# Patient Record
Sex: MALE | Race: WHITE | NOT HISPANIC OR LATINO | Employment: FULL TIME | URBAN - METROPOLITAN AREA
[De-identification: names, ages, dates, MRNs, and addresses within clinical notes are randomized per-mention and may not be internally consistent; named-entity substitution may affect disease eponyms.]

---

## 2019-08-22 ENCOUNTER — TELEPHONE (OUTPATIENT)
Dept: OBGYN CLINIC | Facility: HOSPITAL | Age: 44
End: 2019-08-22

## 2019-08-22 NOTE — TELEPHONE ENCOUNTER
Left voicemail message in response to an online ortho appointment for his Neck  Gave our number to call us

## 2020-08-20 ENCOUNTER — TELEPHONE (OUTPATIENT)
Dept: OBGYN CLINIC | Facility: HOSPITAL | Age: 45
End: 2020-08-20

## 2020-08-20 NOTE — TELEPHONE ENCOUNTER
Tried calling pt to move his appt up with dr Imelda De Souza for 8/24n @ 430pm  The doctor will not be in at that time  I have openings in the morning and some 2pm slots he can come in at any of those times  L/M stating that  Call me when patient calls to reschedule time

## 2021-04-08 DIAGNOSIS — Z23 ENCOUNTER FOR IMMUNIZATION: ICD-10-CM

## 2021-04-20 ENCOUNTER — IMMUNIZATIONS (OUTPATIENT)
Dept: FAMILY MEDICINE CLINIC | Facility: HOSPITAL | Age: 46
End: 2021-04-20

## 2021-04-20 DIAGNOSIS — Z23 ENCOUNTER FOR IMMUNIZATION: Primary | ICD-10-CM

## 2021-04-20 PROCEDURE — 0001A SARS-COV-2 / COVID-19 MRNA VACCINE (PFIZER-BIONTECH) 30 MCG: CPT

## 2021-04-20 PROCEDURE — 91300 SARS-COV-2 / COVID-19 MRNA VACCINE (PFIZER-BIONTECH) 30 MCG: CPT

## 2021-05-14 ENCOUNTER — IMMUNIZATIONS (OUTPATIENT)
Dept: FAMILY MEDICINE CLINIC | Facility: HOSPITAL | Age: 46
End: 2021-05-14

## 2021-05-14 DIAGNOSIS — Z23 ENCOUNTER FOR IMMUNIZATION: Primary | ICD-10-CM

## 2021-05-14 PROCEDURE — 0002A SARS-COV-2 / COVID-19 MRNA VACCINE (PFIZER-BIONTECH) 30 MCG: CPT

## 2021-05-14 PROCEDURE — 91300 SARS-COV-2 / COVID-19 MRNA VACCINE (PFIZER-BIONTECH) 30 MCG: CPT

## 2024-01-29 ENCOUNTER — TELEPHONE (OUTPATIENT)
Dept: GASTROENTEROLOGY | Facility: CLINIC | Age: 49
End: 2024-01-29

## 2024-01-29 ENCOUNTER — OFFICE VISIT (OUTPATIENT)
Dept: GASTROENTEROLOGY | Facility: CLINIC | Age: 49
End: 2024-01-29
Payer: COMMERCIAL

## 2024-01-29 ENCOUNTER — LAB (OUTPATIENT)
Dept: LAB | Facility: HOSPITAL | Age: 49
End: 2024-01-29
Payer: COMMERCIAL

## 2024-01-29 VITALS
BODY MASS INDEX: 28.09 KG/M2 | SYSTOLIC BLOOD PRESSURE: 184 MMHG | WEIGHT: 207.4 LBS | HEIGHT: 72 IN | DIASTOLIC BLOOD PRESSURE: 89 MMHG

## 2024-01-29 DIAGNOSIS — F17.200 SMOKER: ICD-10-CM

## 2024-01-29 DIAGNOSIS — K21.9 GASTROESOPHAGEAL REFLUX DISEASE, UNSPECIFIED WHETHER ESOPHAGITIS PRESENT: ICD-10-CM

## 2024-01-29 DIAGNOSIS — R10.32 LEFT LOWER QUADRANT ABDOMINAL PAIN: Primary | ICD-10-CM

## 2024-01-29 DIAGNOSIS — R19.4 CHANGE IN BOWEL HABITS: ICD-10-CM

## 2024-01-29 DIAGNOSIS — Z12.11 COLON CANCER SCREENING: ICD-10-CM

## 2024-01-29 PROBLEM — K58.2 IRRITABLE BOWEL SYNDROME WITH BOTH CONSTIPATION AND DIARRHEA: Status: ACTIVE | Noted: 2024-01-29

## 2024-01-29 LAB
ALBUMIN SERPL BCP-MCNC: 5 G/DL (ref 3.5–5)
ALP SERPL-CCNC: 60 U/L (ref 34–104)
ALT SERPL W P-5'-P-CCNC: 27 U/L (ref 7–52)
ANION GAP SERPL CALCULATED.3IONS-SCNC: 8 MMOL/L
AST SERPL W P-5'-P-CCNC: 66 U/L (ref 13–39)
BILIRUB SERPL-MCNC: 0.55 MG/DL (ref 0.2–1)
BUN SERPL-MCNC: 10 MG/DL (ref 5–25)
CALCIUM SERPL-MCNC: 10.1 MG/DL (ref 8.4–10.2)
CHLORIDE SERPL-SCNC: 102 MMOL/L (ref 96–108)
CO2 SERPL-SCNC: 27 MMOL/L (ref 21–32)
CREAT SERPL-MCNC: 1.08 MG/DL (ref 0.6–1.3)
ERYTHROCYTE [DISTWIDTH] IN BLOOD BY AUTOMATED COUNT: 12.6 % (ref 11.6–15.1)
GFR SERPL CREATININE-BSD FRML MDRD: 79 ML/MIN/1.73SQ M
GLUCOSE P FAST SERPL-MCNC: 92 MG/DL (ref 65–99)
HCT VFR BLD AUTO: 52 % (ref 36.5–49.3)
HGB BLD-MCNC: 16.9 G/DL (ref 12–17)
MCH RBC QN AUTO: 29.3 PG (ref 26.8–34.3)
MCHC RBC AUTO-ENTMCNC: 32.5 G/DL (ref 31.4–37.4)
MCV RBC AUTO: 90 FL (ref 82–98)
PLATELET # BLD AUTO: 297 THOUSANDS/UL (ref 149–390)
PMV BLD AUTO: 11.6 FL (ref 8.9–12.7)
POTASSIUM SERPL-SCNC: 5.5 MMOL/L (ref 3.5–5.3)
PROT SERPL-MCNC: 7.8 G/DL (ref 6.4–8.4)
RBC # BLD AUTO: 5.77 MILLION/UL (ref 3.88–5.62)
SODIUM SERPL-SCNC: 137 MMOL/L (ref 135–147)
WBC # BLD AUTO: 10.48 THOUSAND/UL (ref 4.31–10.16)

## 2024-01-29 PROCEDURE — 86364 TISS TRNSGLTMNASE EA IG CLAS: CPT

## 2024-01-29 PROCEDURE — 86231 EMA EACH IG CLASS: CPT

## 2024-01-29 PROCEDURE — 80053 COMPREHEN METABOLIC PANEL: CPT

## 2024-01-29 PROCEDURE — 86258 DGP ANTIBODY EACH IG CLASS: CPT

## 2024-01-29 PROCEDURE — 99204 OFFICE O/P NEW MOD 45 MIN: CPT | Performed by: INTERNAL MEDICINE

## 2024-01-29 PROCEDURE — 36415 COLL VENOUS BLD VENIPUNCTURE: CPT

## 2024-01-29 PROCEDURE — 85027 COMPLETE CBC AUTOMATED: CPT

## 2024-01-29 PROCEDURE — 82784 ASSAY IGA/IGD/IGG/IGM EACH: CPT

## 2024-01-29 RX ORDER — CLONAZEPAM 0.5 MG/1
TABLET ORAL
COMMUNITY

## 2024-01-29 RX ORDER — OMEGA-3S/DHA/EPA/FISH OIL/D3 300MG-1000
400 CAPSULE ORAL DAILY
COMMUNITY

## 2024-01-29 RX ORDER — POLYETHYLENE GLYCOL 3350, SODIUM SULFATE ANHYDROUS, SODIUM BICARBONATE, SODIUM CHLORIDE, POTASSIUM CHLORIDE 236; 22.74; 6.74; 5.86; 2.97 G/4L; G/4L; G/4L; G/4L; G/4L
4000 POWDER, FOR SOLUTION ORAL ONCE
Qty: 4000 ML | Refills: 0 | Status: SHIPPED | OUTPATIENT
Start: 2024-01-29 | End: 2024-01-29

## 2024-01-29 NOTE — PROGRESS NOTES
[facility] - Outpatient Consultation  Idris Reyes 48 y.o. male MRN: [id number]  Encounter: [id number]    ASSESSMENT AND PLAN:      1. Left lower quadrant abdominal pain  This is a 48-year-old male here today at the request of Dr. Guillaume for 2 weeks of left lower quadrant abdominal pain.  Patient states that he saw his primary who told him he had prostatitis but and was given 5 days of Augmentin without real improvement of his discomfort.  Denies any fevers or chills.  At baseline does have significant anxiety with what appears to be irregular bowels.  No bleeding.    Will rule out diverticulitis given the local discomfort.  No obvious hernias palpable on exam.  If CT is negative, will proceed with colonoscopy.    - CT abdomen pelvis w contrast; Future  - Colonoscopy; Future  - polyethylene glycol (Golytely) 4000 mL solution; Take 4,000 mL by mouth once for 1 dose Take 4000 mL by mouth once for 1 dose. Use as directed  Dispense: 4000 mL; Refill: 0    2. Change in bowel habits  Long history of intermittent diarrhea and constipation.  More with softer stools especially when he is anxious.  He is a smoker.  Will rule out celiac and microscopic colitis.    - Comprehensive metabolic panel; Future  - Celiac Disease Antibody Profile; Future  - CBC; Future  - Colonoscopy; Future  - polyethylene glycol (Golytely) 4000 mL solution; Take 4,000 mL by mouth once for 1 dose Take 4000 mL by mouth once for 1 dose. Use as directed  Dispense: 4000 mL; Refill: 0    3. Gastroesophageal reflux disease, unspecified whether esophagitis present  Takes omeprazole 40 mg daily.  States he had an EGD was was negative over 10 years ago.  Can consider repeating EGD after the acute abdominal pain issues have resolved.    4. Colon cancer screening  Average risk, states he had a negative Cologuard couple years ago.  Also had a negative colonoscopy over 10 years ago.    5. Smoker  Active smoker, history of  palate carcinoma.  Recommend smoking cessation.      Followup Appointment: After above studies  ______________________________________________________________________    Chief Complaint   Patient presents with   • Lower, left quadrant pain   • Diarrhea   • Constipation       HPI:   Idris Reyes is a 48 y.o. year old male who presents today at the request of his PCP for left lower quadrant abdominal pain and irregular bowels.  Patient states that he has always had a lot of anxiety.  Takes Cymbalta and Klonopin.  Because of this, he is always had some irregular bowels usually alternating from constipation to loose bowels.  Generally more on the looser side and he did have a colonoscopy with an endoscopy over 10 years ago which was otherwise unremarkable.  Denies significant bleeding or weight changes.    2 weeks ago he started having significant left lower quadrant discomfort.  He saw his primary because he was also having some dysuria.  The pain was sharp constant in the left lower quadrant, radiating down his left groin area.  No bleeding.  He was told he has prostatitis and was given Bactrim.  Bactrim caused diarrhea so after 5 days he stopped it.  Since then, his bowels are more or less back to baseline, soft, but now has ongoing left lower quadrant discomfort.  No fevers or chills.  No nausea or vomiting.  Never had an episode of diverticulitis in the past.    Historical Information   Past Medical History:   Diagnosis Date   • Anxiety    • GERD (gastroesophageal reflux disease)    • Oral cancer (HCC)      Past Surgical History:   Procedure Laterality Date   • COLONOSCOPY  2013   • MOUTH SURGERY      oral cancer     Social History     Substance and Sexual Activity   Alcohol Use Not Currently     Social History     Substance and Sexual Activity   Drug Use No     Social History     Tobacco Use   Smoking Status Every Day   • Current packs/day: 1.00   • Types: Cigarettes   Smokeless Tobacco Former     Family  History   Problem Relation Age of Onset   • Heart disease Father    • Heart disease Brother    • Colon cancer Neg Hx    • Colon polyps Neg Hx        Meds/Allergies     Current Outpatient Medications:   •  B Complex Vitamins (VITAMIN B COMPLEX PO)  •  cholecalciferol (VITAMIN D3) 400 units tablet  •  clonazePAM (KlonoPIN) 0.5 mg tablet  •  DULoxetine (CYMBALTA) 60 mg delayed release capsule  •  Multiple Vitamins-Minerals (ZINC PO)  •  omeprazole (PriLOSEC) 40 MG capsule  •  polyethylene glycol (Golytely) 4000 mL solution    Allergies   Allergen Reactions   • Ciprofloxacin Hives   • Levaquin [Levofloxacin] Anxiety   • Sulfa Antibiotics Anxiety       PHYSICAL EXAM:    Blood pressure (!) 184/89, height 6' (1.829 m), weight 94.1 kg (207 lb 6.4 oz). Body mass index is 28.13 kg/m².  General Appearance: NAD, cooperative, alert  Eyes: Anicteric, conjunctiva pink   ENT:  Normocephalic, atraumatic, normal mucosa.    Neck:  Supple, symmetrical, trachea midline,   Resp:  Clear to auscultation bilaterally; no rales, rhonchi or wheezing; respirations unlabored   CV:  S1 S2, Regular rate and rhythm; no murmur, rub, or gallop.  GI:  Soft, left lower quadrant tender to palpation, no obvious hernias, non-distended; normal bowel sounds; no masses, no organomegaly   Rectal: Deferred  Musculoskeletal: No cyanosis, clubbing or edema. Normal ROM.  Skin:  No jaundice, rashes, or lesions   Heme/Lymph: No palpable cervical lymphadenopathy  Psych: Normal affect, good eye contact  Neuro: No gross deficits, AAOx3      REVIEW OF SYSTEMS:    CONSTITUTIONAL: Denies any fever, chills, rigors, and weight loss.  HEENT: No earache or tinnitus. Denies hearing loss or visual disturbances.  CARDIOVASCULAR: No chest pain or palpitations.   RESPIRATORY: Denies any cough, hemoptysis, shortness of breath or dyspnea on exertion.  GASTROINTESTINAL: As noted in the History of Present Illness.   GENITOURINARY: Per HPI  NEUROLOGIC: No dizziness or vertigo, denies  headaches.   MUSCULOSKELETAL: Denies any muscle or joint pain.   SKIN: Denies skin rashes or itching.   ENDOCRINE: Denies excessive thirst. Denies intolerance to heat or cold.  PSYCHOSOCIAL: Positive history of anxiety and depression

## 2024-01-29 NOTE — TELEPHONE ENCOUNTER
Scheduled date of colonoscopy (as of today):3-14-24  Physician performing colonoscopy:Shin  Location of colonoscopy:[facility]  Bowel prep reviewed with patient:Valerie Gates reviewclari  wife     Ermane

## 2024-01-30 LAB
ENDOMYSIUM IGA SER QL: NEGATIVE
GLIADIN PEPTIDE IGA SER-ACNC: 5 UNITS (ref 0–19)
GLIADIN PEPTIDE IGG SER-ACNC: 3 UNITS (ref 0–19)
IGA SERPL-MCNC: 212 MG/DL (ref 90–386)
TTG IGA SER-ACNC: <2 U/ML (ref 0–3)
TTG IGG SER-ACNC: <2 U/ML (ref 0–5)

## 2024-02-06 ENCOUNTER — TELEPHONE (OUTPATIENT)
Dept: GASTROENTEROLOGY | Facility: CLINIC | Age: 49
End: 2024-02-06

## 2024-02-06 NOTE — TELEPHONE ENCOUNTER
Patients GI provider:  Dr. Elliott    Number to return call: [phone]    Reason for call: Pt calling stating CT scan needs PA. Pt would like auth done asap as he is having anxiety about the CT scan and wants it done sooner but can't move it up until he has the auth.     Scheduled procedure/appointment date if applicable:

## 2024-02-06 NOTE — TELEPHONE ENCOUNTER
Pt said that the ct department has not received the prior auth and the pt would like the prior auth number to provide the ct department. I gave pt the Auth number     [id number]

## 2024-02-12 ENCOUNTER — HOSPITAL ENCOUNTER (OUTPATIENT)
Dept: CT IMAGING | Facility: HOSPITAL | Age: 49
Discharge: HOME | End: 2024-02-12
Attending: INTERNAL MEDICINE
Payer: COMMERCIAL

## 2024-02-12 DIAGNOSIS — R10.32 LEFT LOWER QUADRANT ABDOMINAL PAIN: ICD-10-CM

## 2024-02-12 PROCEDURE — G1004 CDSM NDSC: HCPCS

## 2024-02-12 PROCEDURE — 74177 CT ABD & PELVIS W/CONTRAST: CPT

## 2024-02-12 RX ADMIN — IOHEXOL 100 ML: 350 INJECTION, SOLUTION INTRAVENOUS at 19:38

## 2024-02-15 ENCOUNTER — NURSE TRIAGE (OUTPATIENT)
Age: 49
End: 2024-02-15

## 2024-02-15 NOTE — TELEPHONE ENCOUNTER
Pt looking for results of CT done on 2/12. He is very anxious, I did inform him the results have not been read by radiologist yet.

## 2024-02-21 PROBLEM — Z12.11 COLON CANCER SCREENING: Status: RESOLVED | Noted: 2024-01-29 | Resolved: 2024-02-21

## 2024-02-29 ENCOUNTER — ANESTHESIA EVENT (OUTPATIENT)
Dept: ANESTHESIOLOGY | Facility: AMBULATORY SURGERY CENTER | Age: 49
End: 2024-02-29

## 2024-02-29 ENCOUNTER — ANESTHESIA (OUTPATIENT)
Dept: ANESTHESIOLOGY | Facility: AMBULATORY SURGERY CENTER | Age: 49
End: 2024-02-29

## 2024-03-05 ENCOUNTER — TELEPHONE (OUTPATIENT)
Dept: GASTROENTEROLOGY | Facility: CLINIC | Age: 49
End: 2024-03-05

## 2024-03-05 NOTE — TELEPHONE ENCOUNTER
Procedure confirmed  Colonoscopy     Via: [email or web address]    Instructions given: Given to Patient at Visit     Prep Given: Golytely    Call the office if there are any questions.

## 2024-03-13 NOTE — TELEPHONE ENCOUNTER
Pt rescheduled his colonoscopy to 5/9/24 due to possible covid. I called [facility] and spoke to Liv to let them know

## 2024-04-25 ENCOUNTER — ANESTHESIA (OUTPATIENT)
Dept: ANESTHESIOLOGY | Facility: AMBULATORY SURGERY CENTER | Age: 49
End: 2024-04-25

## 2024-04-25 ENCOUNTER — ANESTHESIA EVENT (OUTPATIENT)
Dept: ANESTHESIOLOGY | Facility: AMBULATORY SURGERY CENTER | Age: 49
End: 2024-04-25

## 2024-05-01 ENCOUNTER — TELEPHONE (OUTPATIENT)
Dept: GASTROENTEROLOGY | Facility: CLINIC | Age: 49
End: 2024-05-01

## 2024-05-01 NOTE — TELEPHONE ENCOUNTER
Procedure confirmed  Colonoscopy     Via: [email or web address]    Instructions given: Given to Patient at Visit     Prep Given: Golytely    Call the office if there are any questions.

## 2024-05-08 ENCOUNTER — TELEPHONE (OUTPATIENT)
Age: 49
End: 2024-05-08

## 2024-05-08 NOTE — TELEPHONE ENCOUNTER
Rescheduled date of colonoscopy (as of today): 06/12/2024  Physician performing colonoscopy: DR FORMAN  Location of colonoscopy: [facility]  Bowel prep reviewed with patient: GOLYTELY  Instructions reviewed with patient by: Previously reviewed with pt. Verified pt has all procedure directions.  Clearances: n/a      Patient rescheduled due to work.

## 2024-05-28 ENCOUNTER — TELEPHONE (OUTPATIENT)
Dept: GASTROENTEROLOGY | Facility: CLINIC | Age: 49
End: 2024-05-28

## 2024-05-28 NOTE — TELEPHONE ENCOUNTER
Procedure confirmed  Colonoscopy     Via: [employer]    Instructions given: Given to Patient at Visit     Prep Given: Golytely    Call the office if there are any questions.

## 2024-05-29 ENCOUNTER — ANESTHESIA (OUTPATIENT)
Dept: ANESTHESIOLOGY | Facility: AMBULATORY SURGERY CENTER | Age: 49
End: 2024-05-29

## 2024-05-29 ENCOUNTER — ANESTHESIA EVENT (OUTPATIENT)
Dept: ANESTHESIOLOGY | Facility: AMBULATORY SURGERY CENTER | Age: 49
End: 2024-05-29

## 2024-06-12 ENCOUNTER — ANESTHESIA EVENT (OUTPATIENT)
Dept: GASTROENTEROLOGY | Facility: AMBULATORY SURGERY CENTER | Age: 49
End: 2024-06-12

## 2024-06-12 ENCOUNTER — HOSPITAL ENCOUNTER (OUTPATIENT)
Dept: GASTROENTEROLOGY | Facility: AMBULATORY SURGERY CENTER | Age: 49
Discharge: HOME | End: 2024-06-12
Attending: INTERNAL MEDICINE
Payer: COMMERCIAL

## 2024-06-12 ENCOUNTER — ANESTHESIA (OUTPATIENT)
Dept: GASTROENTEROLOGY | Facility: AMBULATORY SURGERY CENTER | Age: 49
End: 2024-06-12

## 2024-06-12 VITALS
WEIGHT: 205 LBS | HEART RATE: 83 BPM | BODY MASS INDEX: 27.77 KG/M2 | TEMPERATURE: 97.8 F | DIASTOLIC BLOOD PRESSURE: 90 MMHG | OXYGEN SATURATION: 99 % | RESPIRATION RATE: 12 BRPM | SYSTOLIC BLOOD PRESSURE: 148 MMHG | HEIGHT: 72 IN

## 2024-06-12 DIAGNOSIS — R19.4 CHANGE IN BOWEL HABITS: ICD-10-CM

## 2024-06-12 DIAGNOSIS — R10.32 LEFT LOWER QUADRANT ABDOMINAL PAIN: ICD-10-CM

## 2024-06-12 PROCEDURE — 45385 COLONOSCOPY W/LESION REMOVAL: CPT | Performed by: INTERNAL MEDICINE

## 2024-06-12 PROCEDURE — 45380 COLONOSCOPY AND BIOPSY: CPT | Performed by: INTERNAL MEDICINE

## 2024-06-12 PROCEDURE — 88305 TISSUE EXAM BY PATHOLOGIST: CPT | Performed by: STUDENT IN AN ORGANIZED HEALTH CARE EDUCATION/TRAINING PROGRAM

## 2024-06-12 RX ORDER — SODIUM CHLORIDE, SODIUM LACTATE, POTASSIUM CHLORIDE, CALCIUM CHLORIDE 600; 310; 30; 20 MG/100ML; MG/100ML; MG/100ML; MG/100ML
50 INJECTION, SOLUTION INTRAVENOUS CONTINUOUS
Status: DISCONTINUED | OUTPATIENT
Start: 2024-06-12 | End: 2024-06-16 | Stop reason: HOSPADM

## 2024-06-12 RX ORDER — PROPOFOL 10 MG/ML
INJECTION, EMULSION INTRAVENOUS AS NEEDED
Status: DISCONTINUED | OUTPATIENT
Start: 2024-06-12 | End: 2024-06-12

## 2024-06-12 RX ORDER — LIDOCAINE HYDROCHLORIDE 10 MG/ML
INJECTION, SOLUTION EPIDURAL; INFILTRATION; INTRACAUDAL; PERINEURAL AS NEEDED
Status: DISCONTINUED | OUTPATIENT
Start: 2024-06-12 | End: 2024-06-12

## 2024-06-12 RX ADMIN — PROPOFOL 50 MG: 10 INJECTION, EMULSION INTRAVENOUS at 09:42

## 2024-06-12 RX ADMIN — SODIUM CHLORIDE, SODIUM LACTATE, POTASSIUM CHLORIDE, CALCIUM CHLORIDE 50 ML/HR: 600; 310; 30; 20 INJECTION, SOLUTION INTRAVENOUS at 09:11

## 2024-06-12 RX ADMIN — PROPOFOL 200 MG: 10 INJECTION, EMULSION INTRAVENOUS at 09:38

## 2024-06-12 RX ADMIN — PROPOFOL 50 MG: 10 INJECTION, EMULSION INTRAVENOUS at 09:49

## 2024-06-12 RX ADMIN — PROPOFOL 50 MG: 10 INJECTION, EMULSION INTRAVENOUS at 09:54

## 2024-06-12 RX ADMIN — LIDOCAINE HYDROCHLORIDE 50 MG: 10 INJECTION, SOLUTION EPIDURAL; INFILTRATION; INTRACAUDAL; PERINEURAL at 09:38

## 2024-06-12 RX ADMIN — PROPOFOL 50 MG: 10 INJECTION, EMULSION INTRAVENOUS at 09:46

## 2024-06-12 NOTE — ANESTHESIA POSTPROCEDURE EVALUATION
Post-Op Assessment Note    CV Status:  Stable  Pain Score: 0    Pain management: adequate       Mental Status:  Alert and awake   Hydration Status:  Euvolemic   PONV Controlled:  Controlled   Airway Patency:  Patent     Post Op Vitals Reviewed: Yes    No anethesia notable event occurred.    Staff: Anesthesiologist, CRNA               BP   113/73   Temp   N/a   Pulse  81   Resp   16   SpO2   98

## 2024-06-12 NOTE — H&P
History and Physical - [facility]    Idris Reyes 48 y.o. male MRN: [id number]      HPI: Idris Reyes is a 48 y.o. male who presents for changes in bowel habits, abd pain LLQ. Normal CT A/P.     Allergies   Allergen Reactions    Ciprofloxacin Hives    Levaquin [Levofloxacin] Anxiety    Sulfa Antibiotics Anxiety         REVIEW OF SYSTEMS: Per the HPI, and otherwise unremarkable.    Historical Information     Past Medical History:   Diagnosis Date    Anxiety     GERD (gastroesophageal reflux disease)     Oral cancer (HCC)      Past Surgical History:   Procedure Laterality Date    COLONOSCOPY  2013    MOUTH SURGERY      oral cancer    WRIST SURGERY Left      Social History   Social History     Substance and Sexual Activity   Alcohol Use Not Currently     Social History     Substance and Sexual Activity   Drug Use No     Social History     Tobacco Use   Smoking Status Every Day    Current packs/day: 1.00    Types: Cigarettes   Smokeless Tobacco Former    Types: Chew     Family History   Problem Relation Age of Onset    Heart disease Father     Heart disease Brother     Colon cancer Neg Hx     Colon polyps Neg Hx        Meds/Allergies       Current Outpatient Medications:     clonazePAM (KlonoPIN) 0.5 mg tablet    DULoxetine (CYMBALTA) 60 mg delayed release capsule    omeprazole (PriLOSEC) 40 MG capsule    B Complex Vitamins (VITAMIN B COMPLEX PO)    cholecalciferol (VITAMIN D3) 400 units tablet    Multiple Vitamins-Minerals (ZINC PO)    polyethylene glycol (Golytely) 4000 mL solution    Current Facility-Administered Medications:     lactated ringers infusion, 50 mL/hr, Intravenous, Continuous, 50 mL/hr at 06/12/24 0911        Objective     /99   Pulse 103   Temp 97.8 °F (36.6 °C) (Temporal)   Resp 16   Ht 6' (1.829 m)   Wt 93 kg (205 lb)   SpO2 99%   BMI 27.80 kg/m²       PHYSICAL EXAM    Gen: NAD AAOx3  Head: Normocephalic, Atraumatic  CV: S1S2 RRR no  m/r/g  CHEST: Clear b/l no c/r/w  ABD: soft, +BS NT/ND no masses  EXT: no edema      ASSESSMENT/PLAN:  This is a 48 y.o. year old male here for colonoscopy, and he is stable and optimized for his procedure.

## 2024-06-12 NOTE — ANESTHESIA PREPROCEDURE EVALUATION
Procedure:  COLONOSCOPY    Relevant Problems   GI/HEPATIC   (+) Gastroesophageal reflux disease      NEURO/PSYCH   (+) Generalized anxiety disorder with panic attacks      PULMONARY   (+) Smoker      Oncology   (+) Carcinoma of palate (HCC)    Rmoved, no further treatment, denies hoarseness or difficulty swallowing    Physical Exam    Airway    Mallampati score: II  TM Distance: >3 FB  Neck ROM: full     Dental   No notable dental hx     Cardiovascular      Pulmonary      Other Findings        Anesthesia Plan  ASA Score- 2     Anesthesia Type- IV sedation with anesthesia with ASA Monitors.         Additional Monitors:     Airway Plan:            Plan Factors-Exercise tolerance (METS): >4 METS.    Chart reviewed.    Patient summary reviewed.    Patient is not a current smoker.              Induction- intravenous.    Postoperative Plan-         Informed Consent- Anesthetic plan and risks discussed with patient.  I personally reviewed this patient with the CRNA. Discussed and agreed on the Anesthesia Plan with the CRNA..

## 2024-06-17 PROCEDURE — 88305 TISSUE EXAM BY PATHOLOGIST: CPT | Performed by: STUDENT IN AN ORGANIZED HEALTH CARE EDUCATION/TRAINING PROGRAM

## 2024-11-01 ENCOUNTER — APPOINTMENT (OUTPATIENT)
Dept: RADIOLOGY | Facility: CLINIC | Age: 49
End: 2024-11-01
Payer: COMMERCIAL

## 2024-11-01 ENCOUNTER — OFFICE VISIT (OUTPATIENT)
Dept: PAIN MEDICINE | Facility: CLINIC | Age: 49
End: 2024-11-01
Payer: COMMERCIAL

## 2024-11-01 VITALS
BODY MASS INDEX: 27.09 KG/M2 | HEIGHT: 72 IN | SYSTOLIC BLOOD PRESSURE: 148 MMHG | TEMPERATURE: 99.5 F | WEIGHT: 200 LBS | DIASTOLIC BLOOD PRESSURE: 78 MMHG | HEART RATE: 123 BPM

## 2024-11-01 DIAGNOSIS — M54.2 NECK PAIN: ICD-10-CM

## 2024-11-01 DIAGNOSIS — F41.0 GENERALIZED ANXIETY DISORDER WITH PANIC ATTACKS: ICD-10-CM

## 2024-11-01 DIAGNOSIS — F41.1 GENERALIZED ANXIETY DISORDER WITH PANIC ATTACKS: ICD-10-CM

## 2024-11-01 DIAGNOSIS — M79.18 CERVICAL MYOFASCIAL PAIN SYNDROME: Primary | ICD-10-CM

## 2024-11-01 PROCEDURE — 99204 OFFICE O/P NEW MOD 45 MIN: CPT | Performed by: ANESTHESIOLOGY

## 2024-11-01 PROCEDURE — 72050 X-RAY EXAM NECK SPINE 4/5VWS: CPT

## 2024-11-01 NOTE — PROGRESS NOTES
Assessment  1. Cervical myofascial pain syndrome    2. Neck pain    3. Generalized anxiety disorder with panic attacks        Plan      Pleasant 49-year-old gentleman with a longstanding history of neck and muscle pain.  He also reports a great deal of tension and stress with anxiety disorder.    On physical exam he has a great deal of overlying myofascial pain.  He certainly may have underlying cervical spondylosis.  My recommendations are as follows:    I will obtain cervical radiographs to rule out any acute pathology that may be contributing to his symptoms.    I will start him on a course of physical therapy for myofascial release and nerve glides.  He is in agreement with plan and referral was placed.    We will follow-up in approximately 3 to 4 weeks time.  Will review x-ray results and results from physical therapy.  If his neck pain persists then I would consider trigger point injections with Dr. Gonzalez will reevaluate if needed.    My impressions and treatment recommendations were discussed in detail with the patient who verbalized understanding and had no further questions.  Discharge instructions were provided. I personally saw and examined the patient and I agree with the above discussed plan of care.  This note is created using dictation transcription.  It may contain typographical errors, grammatical errors, improperly dictated words, background noise and other errors.    Orders Placed This Encounter   Procedures    XR spine cervical complete 4 or 5 vw non injury     Standing Status:   Future     Standing Expiration Date:   11/1/2028     Scheduling Instructions:      Bring along any outside films relating to this procedure.           Order Specific Question:   Reason for Exam:     Answer:   neck pain    Ambulatory referral to Physical Therapy     Standing Status:   Future     Standing Expiration Date:   11/1/2025     Referral Priority:   Routine     Referral Type:   Physical Therapy     Referral  Reason:   Specialty Services Required     Requested Specialty:   Physical Therapy     Number of Visits Requested:   1     Expiration Date:   11/1/2025       Referring physician: Nilam Joseph MD  History of Present Illness    Idris Reyes is a 49 y.o. male who reports [id number] history of neck pain and upper scapular pain.  He is unaware of any clear precipitant event denies any trauma or injury.  He does admit to a great deal of stress and anxiety and diagnosed with anxiety disorder and may carry a lot of his stress in his muscles.    He did undergo chiropractic treatment but with heavy manipulation it deterred him from further care.    His pain is moderate to severe he rates it as 5 out of 10 the visual analog scale interfering with his daily living activities.  It is constant but worse in the afternoon and evening described as shooting sharp and achy.  He denies any weakness.  He reports that standing sitting and walking aggravate his symptoms.  Chiropractic treatment as reported reported no relief.    I have personally reviewed and/or updated the patient's past medical history, past surgical history, family history, social history, current medications, allergies, and vital signs today.     Review of Systems   Constitutional:  Negative for fever and unexpected weight change.   HENT:  Negative for trouble swallowing.    Eyes:  Negative for visual disturbance.   Respiratory:  Negative for shortness of breath and wheezing.    Cardiovascular:  Negative for chest pain and palpitations.   Gastrointestinal:  Negative for constipation, diarrhea, nausea and vomiting.   Endocrine: Negative for cold intolerance, heat intolerance and polydipsia.   Genitourinary:  Negative for difficulty urinating and frequency.   Musculoskeletal:  Negative for arthralgias, gait problem, joint swelling and myalgias.   Skin:  Negative for rash.   Neurological:  Positive for dizziness, numbness and headaches. Negative for  seizures, syncope and weakness.   Hematological:  Does not bruise/bleed easily.   Psychiatric/Behavioral:  Positive for dysphoric mood.    All other systems reviewed and are negative.      Patient Active Problem List   Diagnosis    Carcinoma of palate (HCC)    Generalized anxiety disorder with panic attacks    Smoking addiction    Gastroesophageal reflux disease    Left lower quadrant abdominal pain    Irritable bowel syndrome with both constipation and diarrhea    Smoker       Past Medical History:   Diagnosis Date    Anxiety     Cancer (HCC)     GERD (gastroesophageal reflux disease)     Oral cancer (HCC)        Past Surgical History:   Procedure Laterality Date    COLONOSCOPY  2013    MOUTH SURGERY      oral cancer    WRIST SURGERY Left        Family History   Problem Relation Age of Onset    Heart disease Father     Heart disease Brother     Colon cancer Neg Hx     Colon polyps Neg Hx        Social History     Occupational History    Not on file   Tobacco Use    Smoking status: Every Day     Current packs/day: 1.00     Types: Cigarettes    Smokeless tobacco: Former     Types: Chew   Vaping Use    Vaping status: Never Used   Substance and Sexual Activity    Alcohol use: Not Currently    Drug use: No    Sexual activity: Not on file       Current Outpatient Medications on File Prior to Visit   Medication Sig    B Complex Vitamins (VITAMIN B COMPLEX PO) Take by mouth    clonazePAM (KlonoPIN) 0.5 mg tablet TAKE 1 TABLET BY MOUTH TWICE A DAY AS NEEDED FOR 30 DAYS    DULoxetine (CYMBALTA) 60 mg delayed release capsule Take 60 mg by mouth daily.    Multiple Vitamins-Minerals (ZINC PO) Take by mouth    omeprazole (PriLOSEC) 40 MG capsule Take 40 mg by mouth daily.    [DISCONTINUED] cholecalciferol (VITAMIN D3) 400 units tablet Take 400 Units by mouth daily     No current facility-administered medications on file prior to visit.       Allergies   Allergen Reactions    Ciprofloxacin Hives    Levaquin [Levofloxacin] Anxiety     Sulfa Antibiotics Anxiety       Physical Exam    /78 (BP Location: Left arm, Patient Position: Sitting, Cuff Size: Standard)   Pulse (!) 123   Temp 99.5 °F (37.5 °C)   Ht 6' (1.829 m)   Wt 90.7 kg (200 lb)   BMI 27.12 kg/m²     Constitutional: normal, well developed, well nourished, alert, in no distress and non-toxic and no overt pain behavior.  Eyes: anicteric  HEENT: grossly intact  Neck: supple, symmetric, trachea midline and no masses   Pulmonary:even and unlabored  Cardiovascular:No edema or pitting edema present  Skin:Normal without rashes or lesions and well hydrated  Psychiatric:Mood and affect appropriate  Neurologic:Cranial Nerves II-XII grossly intact  Musculoskeletal:normal,  Inspection: Normal station and gait. Normal cervical curves and head posture. Skin intact without erythema. No sensory loss. There is no atrophy.   Palpation: There is mild tenderness in the cervical paraspinals mild superior trapezius tenderness right greater than left no shoulder tenderness  Motor/Strength: Equal strength in the bilateral upper extremities  Reflexes: equal and symmetric in the upper limbs. Sensation: Sensation intact in the upper limbs  Maneuvers: Negative cervical Spurling maneuver. Negative Lhermitte's sign.

## 2024-11-04 ENCOUNTER — TELEPHONE (OUTPATIENT)
Dept: PAIN MEDICINE | Facility: CLINIC | Age: 49
End: 2024-11-04

## 2024-11-04 NOTE — TELEPHONE ENCOUNTER
----- Message from Hima Tse DO sent at 11/2/2024  8:37 PM EDT -----  Mild disc degeneration on cervical x-ray, continue with current plan and follow up.

## 2025-06-10 ENCOUNTER — TELEPHONE (OUTPATIENT)
Age: 50
End: 2025-06-10

## 2025-06-10 ENCOUNTER — OFFICE VISIT (OUTPATIENT)
Dept: PAIN MEDICINE | Facility: CLINIC | Age: 50
End: 2025-06-10
Payer: COMMERCIAL

## 2025-06-10 VITALS — BODY MASS INDEX: 26.82 KG/M2 | WEIGHT: 198 LBS | HEART RATE: 130 BPM | HEIGHT: 72 IN | TEMPERATURE: 97.8 F

## 2025-06-10 DIAGNOSIS — M54.16 RIGHT LUMBAR RADICULOPATHY: Primary | ICD-10-CM

## 2025-06-10 PROCEDURE — 99214 OFFICE O/P EST MOD 30 MIN: CPT | Performed by: ANESTHESIOLOGY

## 2025-06-10 RX ORDER — PREDNISONE 10 MG/1
TABLET ORAL
Qty: 21 TABLET | Refills: 0 | Status: SHIPPED | OUTPATIENT
Start: 2025-06-10

## 2025-06-10 RX ORDER — VILAZODONE HYDROCHLORIDE 20 MG/1
20 TABLET ORAL DAILY
COMMUNITY
Start: 2025-03-27

## 2025-06-10 NOTE — PROGRESS NOTES
Name: Idris Reyes      : 1975      MRN: [id number]  Encounter Provider: Hiam Tse DO  Encounter Date: 6/10/2025   Encounter department: [facility] [place]  :  Assessment & Plan  Right lumbar radiculopathy    Orders:    X-ray lumbar spine complete 4+ views; Future    MRI lumbar spine without contrast; Future    predniSONE 10 mg tablet; Take according to titration schedule        The patient's pain persists despite time, relative rest, activity modification, and nonsteroidal anti-inflammatories. His pain is significantly interfering with his daily living activities.  He underwent a course of physical therapy.  It is appropriate to order an MRI of the lumbar spine to help evaluate any significant etiology of his symptoms.    I will start him on a titrating dose of oral prednisone to address any inflammatory component of the patient's pain. He understands he should not take nonsteroidal anti-inflammatories until he is finished with this steroid. He understands there is a chance of decreased immunity secondary to steroids. If he has any problems or questions he will give us a call.    Patient is to contact our office or present to hospital Emergency Room if experience worsening or new low back/lower extremity pain, sensory or motor change, bladder or bowel dysfunction, or other neurological change.    Once we obtain MRI results, I will follow-up with the patient, review the results and current symptoms, and discuss the next steps of the treatment plan.    My impressions and treatment recommendations were discussed in detail with the patient who verbalized understanding and had no further questions.  Discharge instructions were provided. I personally saw and examined the patient and I agree with the above discussed plan of care.    History of Present Illness     Idris Reyes is a 49 y.o. male with 6-week history of right-sided low back and lower extremity pain with subjective  weakness of the right lower limb.  He denies any loss of bowel or bladder function.  He is unaware of any clear precipitant denies any trauma or injury.  Underwent chiropractic treatment as well as physical therapy through his insurance company.  His pain is significant or fear with his daily living activities.  He describes a sharp stabbing with a numbing sensation.  Standing and walking aggravate his symptoms while rest takes the edge off.  He has tried nonsteroidal anti-inflammatories without relief.    Review of Systems   Constitutional:  Negative for fever and unexpected weight change.   HENT:  Negative for trouble swallowing.    Eyes:  Negative for visual disturbance.   Respiratory:  Negative for shortness of breath and wheezing.    Cardiovascular:  Negative for chest pain and palpitations.   Gastrointestinal:  Negative for constipation, diarrhea, nausea and vomiting.   Endocrine: Negative for cold intolerance, heat intolerance and polydipsia.   Genitourinary:  Negative for difficulty urinating and frequency.   Musculoskeletal:  Negative for arthralgias, gait problem, joint swelling and myalgias.   Skin:  Negative for rash.   Neurological:  Negative for dizziness, seizures, syncope, weakness and headaches.   Hematological:  Does not bruise/bleed easily.   Psychiatric/Behavioral:  Negative for dysphoric mood.    All other systems reviewed and are negative.    Medical History Reviewed by provider this encounter:  Tobacco  Meds  Problems  Med Hx  Surg Hx  Fam Hx     .       Objective   Pulse (!) 130   Temp 97.8 °F (36.6 °C)   Ht 6' (1.829 m)   Wt 89.8 kg (198 lb)   BMI 26.85 kg/m²      Pain Score:   6  Physical Exam  Constitutional: normal, well developed, well nourished, alert, in no distress and non-toxic and no overt pain behavior.  Eyes: anicteric  HEENT: grossly intact  Neck: supple, symmetric, trachea midline and no masses   Pulmonary: even and unlabored  Cardiovascular: No edema or pitting edema  present  Skin: Normal without rashes or lesions and well hydrated  Psychiatric: Mood and affect appropriate  Neurologic: Cranial Nerves II-XII grossly intact  Musculoskeletal: normal and antalgic, difficulty going from sitting to standing sitting position; no obvious skin lesions or erythema lumbar sacral spine; mild tenderness in the right PSIS; deep tendon reflexes are diminished but symmetrical bilateral patellar absent right Achilles 2+ left Achilles; 3 out of 5 strength right extensor hallux longus; decreased sensation in right L5 distribution to pinwheel; positive straight leg raising on the right.

## 2025-06-10 NOTE — TELEPHONE ENCOUNTER
Caller: pt    Doctor: Dr. saenz    Reason for call: pt wants to know if he's supposed to take 5 or 10 mg of prednisone and can he still take his walks?    Call back#: [phone]

## 2025-06-13 ENCOUNTER — HOSPITAL ENCOUNTER (OUTPATIENT)
Dept: RADIOLOGY | Facility: HOSPITAL | Age: 50
End: 2025-06-13
Payer: COMMERCIAL

## 2025-06-13 DIAGNOSIS — M54.16 RIGHT LUMBAR RADICULOPATHY: ICD-10-CM

## 2025-06-13 PROCEDURE — 72110 X-RAY EXAM L-2 SPINE 4/>VWS: CPT

## 2025-06-16 ENCOUNTER — APPOINTMENT (EMERGENCY)
Dept: CT IMAGING | Facility: HOSPITAL | Age: 50
End: 2025-06-16
Attending: EMERGENCY MEDICINE
Payer: COMMERCIAL

## 2025-06-16 ENCOUNTER — HOSPITAL ENCOUNTER (EMERGENCY)
Facility: HOSPITAL | Age: 50
Discharge: HOME | End: 2025-06-16
Attending: EMERGENCY MEDICINE | Admitting: EMERGENCY MEDICINE
Payer: COMMERCIAL

## 2025-06-16 VITALS
DIASTOLIC BLOOD PRESSURE: 92 MMHG | RESPIRATION RATE: 18 BRPM | OXYGEN SATURATION: 100 % | HEART RATE: 101 BPM | TEMPERATURE: 98.5 F | SYSTOLIC BLOOD PRESSURE: 169 MMHG

## 2025-06-16 DIAGNOSIS — M54.9 BACK PAIN: Primary | ICD-10-CM

## 2025-06-16 PROCEDURE — 74176 CT ABD & PELVIS W/O CONTRAST: CPT

## 2025-06-16 PROCEDURE — 99283 EMERGENCY DEPT VISIT LOW MDM: CPT

## 2025-06-16 PROCEDURE — 99284 EMERGENCY DEPT VISIT MOD MDM: CPT | Performed by: EMERGENCY MEDICINE

## 2025-06-16 RX ORDER — ACETAMINOPHEN 325 MG/1
975 TABLET ORAL ONCE
Status: COMPLETED | OUTPATIENT
Start: 2025-06-16 | End: 2025-06-16

## 2025-06-16 RX ORDER — IBUPROFEN 600 MG/1
600 TABLET, FILM COATED ORAL ONCE
Status: COMPLETED | OUTPATIENT
Start: 2025-06-16 | End: 2025-06-16

## 2025-06-16 RX ADMIN — ACETAMINOPHEN 975 MG: 325 TABLET ORAL at 08:37

## 2025-06-16 RX ADMIN — IBUPROFEN 600 MG: 600 TABLET ORAL at 08:37

## 2025-06-16 NOTE — TELEPHONE ENCOUNTER
S/w pt and advised of NM notation, pt is apprehensive to add medication due to his medications he is currently on.  Nurse then advised to take the prescribed medication ED sent in and to try OTC pain creams/lidocaine patches, rest, ice/heat.  Pt will try above and see how he does. Nurse advised once MRI results are in our office will be able to see if interventional tx options will benefit pt. Pt verbalized understanding and appreciative of call.

## 2025-06-16 NOTE — Clinical Note
Idris Reyes was seen and treated in our emergency department on 6/16/2025.                Diagnosis:     Idris  .    He may return on this date: 06/19/2025         If you have any questions or concerns, please don't hesitate to call.      Choco Chavez, DO    ______________________________           _______________          _______________  Hospital Representative                              Date                                Time

## 2025-06-16 NOTE — TELEPHONE ENCOUNTER
I can prescribe gabapentin 300 mg 1 tab daily for 5 days then increase to 1 tab BID.  If interested let me know and I will send to pharmacy today

## 2025-06-16 NOTE — TELEPHONE ENCOUNTER
Caller: Patient    Doctor: Dr. NEWTON    Reason for call: Patient is having hard time sleeping, Still having a lot of pain    Prednisone is not helping and would like to know what he is able to do while waiting for his MRI    Patient went to the ED for back pain  Did get his XR Done and CT     Please advise   Call back#:

## 2025-06-16 NOTE — ED PROVIDER NOTES
Time reflects when diagnosis was documented in both MDM as applicable and the Disposition within this note       Time User Action Codes Description Comment    6/16/2025  9:40 AM Choco Chavez Add [M54.9] Back pain           ED Disposition       ED Disposition   Discharge    Condition   Stable    Date/Time   Mon Jun 16, 2025  9:40 AM    Comment   Idris Reyes discharge to home/self care.                   Assessment & Plan       Medical Decision Making  49-year-old male here for back pain has been present for several weeks.  With PCP and physical therapy.  Feels like symptoms are getting worse.  Pain radiates down right leg, occasional cramping this morning feel like it weakness in his leg giving out.  Patient denies any trauma, unexplained weight loss, numbness or tingling, bowel or bladder incontinence, weakness, fever, IVDA, chronic steroid use or known history of cancer.  Has been taking steroids from his PCP.  Differential includes sciatica, lumbar disc herniation, epidural abscess, cauda equina.  Patient is negative for red flags.  Does express some healthcare anxiety, preference is to get CT scan today.  Has MRI scheduled in a few weeks.  If imaging is stable can be discharged for outpatient follow-up.      Imaging reviewed, no fracture, dislocation or hemorrhagic injury, tumor, mass or lesion appreciated.     Amount and/or Complexity of Data Reviewed  Radiology: ordered and independent interpretation performed.    Risk  OTC drugs.  Prescription drug management.             Medications   acetaminophen (TYLENOL) tablet 975 mg (975 mg Oral Given 6/16/25 0837)   ibuprofen (MOTRIN) tablet 600 mg (600 mg Oral Given 6/16/25 0837)       ED Risk Strat Scores                    No data recorded        SBIRT 20yo+      Flowsheet Row Most Recent Value   Initial Alcohol Screen: US AUDIT-C     1. How often do you have a drink containing alcohol? 0 Filed at: 06/16/2025 0749   2. How many drinks containing alcohol do  you have on a typical day you are drinking?  0 Filed at: 06/16/2025 0749   3a. Male UNDER 65: How often do you have five or more drinks on one occasion? 0 Filed at: 06/16/2025 0749   3b. FEMALE Any Age, or MALE 65+: How often do you have 4 or more drinks on one occassion? 0 Filed at: 06/16/2025 0749   Audit-C Score 0 Filed at: 06/16/2025 0749   TRICE: How many times in the past year have you...    Used an illegal drug or used a prescription medication for non-medical reasons? Never Filed at: 06/16/2025 0749                            History of Present Illness       Chief Complaint   Patient presents with    Back Pain     Pt c/o lower back pain that travels down the right leg. Patient denies injury. No changes in bowel or bladder. Pain has been going on for several weeks. Sees spine doc outpatient. Has xr this past week and has an MRI next month        Past Medical History[1]   Past Surgical History[2]   Family History[3]   Social History[4]   E-Cigarette/Vaping    E-Cigarette Use Never User       E-Cigarette/Vaping Substances      I have reviewed and agree with the history as documented.     Patient presents with:  Back Pain: Pt c/o lower back pain that travels down the right leg. Patient denies injury. No changes in bowel or bladder. Pain has been going on for several weeks. Sees spine doc outpatient. Has xr this past week and has an MRI next month           Back Pain  Associated symptoms: no abdominal pain, no chest pain, no dysuria, no fever, no numbness and no weakness        Review of Systems   Constitutional: Negative.  Negative for chills and fever.   HENT: Negative.  Negative for rhinorrhea, sore throat, trouble swallowing and voice change.    Eyes: Negative.  Negative for pain and visual disturbance.   Respiratory: Negative.  Negative for cough, shortness of breath and wheezing.    Cardiovascular: Negative.  Negative for chest pain and palpitations.   Gastrointestinal:  Negative for abdominal pain,  diarrhea, nausea and vomiting.   Genitourinary: Negative.  Negative for dysuria and frequency.   Musculoskeletal:  Positive for back pain. Negative for neck pain and neck stiffness.   Skin: Negative.  Negative for rash.   Neurological: Negative.  Negative for dizziness, speech difficulty, weakness, light-headedness and numbness.           Objective       ED Triage Vitals [06/16/25 0749]   Temperature Pulse Blood Pressure Respirations SpO2 Patient Position - Orthostatic VS   98.5 °F (36.9 °C) 101 169/92 18 100 % Lying      Temp Source Heart Rate Source BP Location FiO2 (%) Pain Score    Temporal Monitor Right arm -- 8      Vitals      Date and Time Temp Pulse SpO2 Resp BP Pain Score FACES Pain Rating User   06/16/25 0837 -- -- -- -- -- 7 -- AM   06/16/25 0749 98.5 °F (36.9 °C) 101 100 % 18 169/92 8 -- AM            Physical Exam  Vitals and nursing note reviewed.   Constitutional:       General: He is not in acute distress.     Appearance: He is well-developed.   HENT:      Head: Normocephalic and atraumatic.     Eyes:      Conjunctiva/sclera: Conjunctivae normal.      Pupils: Pupils are equal, round, and reactive to light.     Neck:      Trachea: No tracheal deviation.     Cardiovascular:      Rate and Rhythm: Normal rate and regular rhythm.   Pulmonary:      Effort: Pulmonary effort is normal. No respiratory distress.      Breath sounds: Normal breath sounds. No wheezing or rales.   Abdominal:      General: Bowel sounds are normal. There is no distension.      Palpations: Abdomen is soft.      Tenderness: There is no abdominal tenderness. There is no guarding or rebound.     Musculoskeletal:         General: No tenderness or deformity. Normal range of motion.        Arms:       Cervical back: Normal range of motion and neck supple.     Skin:     General: Skin is warm and dry.      Capillary Refill: Capillary refill takes less than 2 seconds.      Findings: No rash.     Neurological:      Mental Status: He is alert  and oriented to person, place, and time.     Psychiatric:         Behavior: Behavior normal.         Results Reviewed       None            CT abdomen pelvis wo contrast   ED Interpretation by Choco Chavez DO (06/16 0927)   No large vertebral fracture or dislocation appreciated.      Final Interpretation by Lizbeth Dubon MD (06/16 0936)      No acute findings in the abdomen or pelvis within the limits of unenhanced technique.         Resident: ADOLPH CAMILO I, the attending radiologist, have reviewed the images and agree with the final report above.      Workstation performed: [id number]             Procedures    ED Medication and Procedure Management   Prior to Admission Medications   Prescriptions Last Dose Informant Patient Reported? Taking?   B Complex Vitamins (VITAMIN B COMPLEX PO)  Self Yes No   Sig: Take by mouth   Multiple Vitamins-Minerals (ZINC PO)  Self Yes No   Sig: Take by mouth   clonazePAM (KlonoPIN) 0.5 mg tablet  Self Yes No   omeprazole (PriLOSEC) 40 MG capsule  Self Yes No   Sig: Take 40 mg by mouth in the morning.   predniSONE 10 mg tablet   No No   Sig: Take according to titration schedule   vilazodone (VIIBRYD) 20 mg tablet   Yes No   Sig: Take 20 mg by mouth daily take with food      Facility-Administered Medications: None     Discharge Medication List as of 6/16/2025  9:40 AM        CONTINUE these medications which have NOT CHANGED    Details   B Complex Vitamins (VITAMIN B COMPLEX PO) Take by mouth, Historical Med      clonazePAM (KlonoPIN) 0.5 mg tablet Historical Med      Multiple Vitamins-Minerals (ZINC PO) Take by mouth, Historical Med      omeprazole (PriLOSEC) 40 MG capsule Take 40 mg by mouth in the morning., Historical Med      predniSONE 10 mg tablet Take according to titration schedule, Print      vilazodone (VIIBRYD) 20 mg tablet Take 20 mg by mouth daily take with food, Starting Thu 3/27/2025, Historical Med             ED SEPSIS DOCUMENTATION   Time reflects when  diagnosis was documented in both MDM as applicable and the Disposition within this note       Time User Action Codes Description Comment    6/16/2025  9:40 AM Choco Chavez Add [M54.9] Back pain                      [1]   Past Medical History:  Diagnosis Date    Anxiety     Cancer (HCC)     GERD (gastroesophageal reflux disease)     Oral cancer (HCC)    [2]   Past Surgical History:  Procedure Laterality Date    COLONOSCOPY  2013    MOUTH SURGERY      oral cancer    WRIST SURGERY Left    [3]   Family History  Problem Relation Name Age of Onset    Heart disease Father      Heart disease Brother      Colon cancer Neg Hx      Colon polyps Neg Hx     [4]   Social History  Tobacco Use    Smoking status: Every Day     Current packs/day: 1.00     Types: Cigarettes    Smokeless tobacco: Former     Types: Chew   Vaping Use    Vaping status: Never Used   Substance Use Topics    Alcohol use: Not Currently    Drug use: No        Choco Chavez DO  06/16/25 2119

## 2025-06-17 ENCOUNTER — TELEPHONE (OUTPATIENT)
Dept: PHYSICAL THERAPY | Facility: OTHER | Age: 50
End: 2025-06-17

## 2025-06-17 ENCOUNTER — NURSE TRIAGE (OUTPATIENT)
Dept: PHYSICAL THERAPY | Facility: OTHER | Age: 50
End: 2025-06-17

## 2025-06-17 DIAGNOSIS — M54.41 ACUTE RIGHT-SIDED LOW BACK PAIN WITH RIGHT-SIDED SCIATICA: Primary | ICD-10-CM

## 2025-06-17 NOTE — TELEPHONE ENCOUNTER
Red Flag and Start Back charting is currently located under Additional Charting.    Comprehensive Spine Program was reviewed in detail and what we can provide for their back pain.  Patient is agreeable to being triaged and would like to proceed with Physical Therapy.    Referral was placed for Physical Therapy at the [place] site. Patients information was sent to the  to make evaluation appointment. Patient made aware that the PT office  will be calling to schedule the appointment.  Patient was provided with the phone number to the PT office.    No further questions and/or concerns were voiced by the patient at this time. Patient states understanding of the referral that was placed.    Referral Closed.

## 2025-06-17 NOTE — TELEPHONE ENCOUNTER
"Background - Initial Assessment  Clinical complaint: ED visit on 06/16 due to Back Pain that started 1 month ago, it radiates down to the hip and right leg (hamstring, calf, even the heel) \"it cramps up\". He does experience tightness in his neck and shoulders. Numbness and tingling sensation when sitting in the sacrum area and tailbone. Established with spine and pain and been to a chiropractor a couple times. Pain \"comes and goes\" worse when sitting. No recent fall, car accident or work related injury. No direct trauma to his back. Patient is currently doing a virtual PT program through his insurances for free but he is interested in advanced outpatient PT. Patient described pain as aching, burning, sharp, shooting, stabbing, throbbing, dull, tightness.  Date of onset: 1 month ago  Frequency of pain: intermittent  Quality of pain: aching, burning, dull, cramp, tight, numb, sharp, shooting, stabbing, throbbing, and tingling.    Protocols used: [facility] Protocol    "

## 2025-06-19 ENCOUNTER — PATIENT MESSAGE (OUTPATIENT)
Dept: PAIN MEDICINE | Facility: CLINIC | Age: 50
End: 2025-06-19

## 2025-06-27 ENCOUNTER — HOSPITAL ENCOUNTER (OUTPATIENT)
Dept: MRI IMAGING | Facility: HOSPITAL | Age: 50
End: 2025-06-27
Attending: ANESTHESIOLOGY
Payer: COMMERCIAL

## 2025-06-27 DIAGNOSIS — M54.16 RIGHT LUMBAR RADICULOPATHY: ICD-10-CM

## 2025-06-27 PROCEDURE — 72148 MRI LUMBAR SPINE W/O DYE: CPT

## 2025-07-01 ENCOUNTER — TELEPHONE (OUTPATIENT)
Dept: PAIN MEDICINE | Facility: CLINIC | Age: 50
End: 2025-07-01

## 2025-07-01 NOTE — TELEPHONE ENCOUNTER
----- Message from Hima Tse DO sent at 7/1/2025 12:04 PM EDT -----  MRI lumbar spine: Mild noncompressive lumbar degenerative change    No disc herniation or spinal stenosis, for further review and discuss neck step of treatment plan please follow-up at scheduled appointment.

## 2025-07-01 NOTE — TELEPHONE ENCOUNTER
S/w pt, advised of above. Pt verbalized understanding and confirmed 7/14/25 ov with NM. Requested to be called if a sooner opening becomes available.

## 2025-07-07 ENCOUNTER — OFFICE VISIT (OUTPATIENT)
Dept: PAIN MEDICINE | Facility: CLINIC | Age: 50
End: 2025-07-07
Payer: COMMERCIAL

## 2025-07-07 VITALS — HEIGHT: 72 IN | TEMPERATURE: 98.8 F | WEIGHT: 188 LBS | BODY MASS INDEX: 25.47 KG/M2

## 2025-07-07 DIAGNOSIS — M79.18 MYOFASCIAL PAIN SYNDROME: ICD-10-CM

## 2025-07-07 PROCEDURE — 99214 OFFICE O/P EST MOD 30 MIN: CPT | Performed by: NURSE PRACTITIONER

## 2025-07-07 RX ORDER — DESVENLAFAXINE 25 MG/1
1 TABLET, EXTENDED RELEASE ORAL DAILY
COMMUNITY
Start: 2025-06-11

## 2025-07-07 NOTE — PROGRESS NOTES
Name: Idris Reyes      : 1975      MRN: [id number]  Encounter Provider: JUDI Lo  Encounter Date: 2025   Encounter department: [facility] [place]  :  Assessment & Plan  Myofascial pain syndrome  Idris Reyes is a 49 y.o. male who presents for a follow up office visit in regards to Back Pain (Right lower back ), Leg Pain, and Hip Pain (Right ).  Patient presents today with ongoing low back pain.  His pain is located mostly on the right side and radiates down the posterior aspect of the right leg.  MRI lumbar spine was reviewed.  There was no significant stenosis or disc herniations.  On exam today, the patient does have pain over the right piriformis muscle.  To help decrease inflammation and pain, a right piriformis injection can be performed. Complete risks and benefits including bleeding, infection, tissue reaction, nerve injury and allergic reaction were discussed. The approach was demonstrated using models and literature was provided. Verbal and written consent was obtained.      Orders:    [place] spine and pain procedure; Future      I advised him to start the cyclobenzaprine that his PCP has ordered to help with his pain.  He was aware the medication may cause drowsiness, so he should not drive when taking the medication.    Follow-up is planned in 4 weeks after procedure or sooner as warranted. The patient was advised to contact the office should their symptoms worsen in the interim.     My impressions and treatment recommendations were discussed in detail with the patient who verbalized understanding and had no further questions.  Discharge instructions were provided. I personally saw and examined the patient and I agree with the above discussed plan of care.    History of Present Illness     Idris Reyes is a 49 y.o. male who presents for a follow up office visit in regards to Back Pain (Right lower back ), Leg Pain, and Hip Pain (Right ). The patient  presents today with ongoing low back pain.  The pain is located across the low back, but primarily on the right side and radiates down the right leg.  The pain occurs on occasion described as dull aching, sharp, and throbbing.  He does feel the pain interacts with his activities of daily living.  He is currently doing virtual PT, due to high deductibles, but is not obtaining relief.  He is rating his pain a 6/10 on the numeric rating scale    He is taking no prescription pain medications.  His primary care physician recently prescribed cyclobenzaprine but the patient has concerns about taking medications that will interact with Pristiq and klonopin    He had an MRI of the lumbar spine since the last office visit which showed mild noncompressive lumbar degenerative change, with no significant central or foraminal stenosis.    Review of Systems    Medical History Reviewed by provider this encounter:     .  Past Medical History   Past Medical History[1]  Past Surgical History[2]  Family History[3]   reports that he has been smoking cigarettes. He started smoking about 34 years ago. He has a 34.5 pack-year smoking history. He quit smokeless tobacco use about 12 years ago.  His smokeless tobacco use included chew. He reports that he does not currently use alcohol. He reports that he does not use drugs.  Current Outpatient Medications   Medication Instructions    Ascorbic Acid (CALLY-C PO) Oral    B Complex Vitamins (VITAMIN B COMPLEX PO) Take by mouth    clonazePAM (KLONOPIN) 1 mg, Oral, 2 times daily    Desvenlafaxine Succinate ER 25 MG TB24 1 tablet, Oral, Daily    Misc Natural Products (BEET ROOT PO) Beet Root    Multiple Vitamins-Minerals (ZINC PO) Take by mouth    omeprazole (PRILOSEC) 40 mg, Daily    VITAMIN D, CHOLECALCIFEROL, PO Oral   Allergies[4]      Objective   Temp 98.8 °F (37.1 °C)   Ht 6' (1.829 m) Comment: Verbal  Wt 85.3 kg (188 lb)   BMI 25.50 kg/m²      Pain Score:   6  Physical Exam  Constitutional:  normal, well developed, well nourished, alert, in no distress and non-toxic and no overt pain behavior.  Eyes: anicteric  HEENT: grossly intact  Neck: supple, symmetric, trachea midline and no masses   Pulmonary: even and unlabored  Cardiovascular: No edema or pitting edema present  Skin: Normal without rashes or lesions and well hydrated  Psychiatric: Mood and affect appropriate  Neurologic: Cranial Nerves II-XII grossly intact  Musculoskeletal: normal    Lumbar Spine Exam    Appearance:  Normal lordosis  Palpation/Tenderness:  right piriformis tenderness  No tenderness over SI joints or lumbar facets  Range of Motion:  Flexion:  No limitation  with pain  Extension:  No limitation  with pain  Motor Strength:  Left hip flexion:  5/5  Right hip flexion:  5/5  Left knee flexion:  5/5  Left knee extension:  5/5  Right knee flexion:  5/5  Right knee extension:  5/5  Left foot dorsiflexion:  5/5  Left foot plantar flexion:  5/5  Right foot dorsiflexion:  5/5  Right foot plantar flexion:  5/5      MRI LUMBAR SPINE WITHOUT CONTRAST @  06/27/2025     INDICATION: M54.16: Radiculopathy, lumbar region.     COMPARISON: 6/13/2025     TECHNIQUE:  Multiplanar, multisequence imaging of the lumbar spine was performed. .        IMAGE QUALITY:  Diagnostic     FINDINGS:     VERTEBRAL BODIES:  There are 5 lumbar type vertebral bodies.  Normal alignment of the lumbar spine.  No spondylolysis or spondylolisthesis. No scoliosis.  No compression fracture.    Scattered degenerative endplate changes.  No focally suspicious marrow   lesions.  No bone marrow edema or compression abnormality.     SACRUM: Scattered degenerative endplate changes.  No focally suspicious marrow lesions.  No bone marrow edema or compression abnormality.     DISTAL CORD AND CONUS:  Normal size and signal within the distal cord and conus. The conus medullaris terminates at the L1 level.     PARASPINAL SOFT TISSUES:  Paraspinal soft tissues are unremarkable.     LOWER  THORACIC DISC SPACES:  Normal disc height and signal.  No disc herniation, canal stenosis or foraminal narrowing.     LUMBAR DISC SPACES:     L1-L2: There is no focal disk herniation, central canal or neural foraminal narrowing.  Bilateral facet hypertrophy noted.     L2-L3: There is no focal disk herniation, central canal or neural foraminal narrowing.  Bilateral facet hypertrophy noted.     L3-L4: There is no focal disk herniation, central canal or neural foraminal narrowing.  Bilateral facet hypertrophy noted.     L4-L5: There is no focal disk herniation, central canal or neural foraminal narrowing.  Bilateral facet hypertrophy noted.     L5-S1: There is a diffuse disk bulge.  No significant central canal or neural foraminal narrowing.  Bilateral facet hypertrophy noted.     OTHER FINDINGS:  None.     IMPRESSION:     Mild noncompressive lumbar degenerative change        XR SPINE LUMBAR MINIMUM 4 VIEWS NON INJURY @ [facility] 06/13/2025    LUMBAR SPINE     INDICATION:   Radiculopathy, lumbar region.      COMPARISON:  None.     VIEWS:  XR SPINE LUMBAR MINIMUM 4 VIEWS NON INJURY  Images: 5     FINDINGS:     There are 5 non rib bearing lumbar vertebral bodies.      There is no evidence of acute fracture or destructive osseous lesion.     Alignment is unremarkable.      Mild multilevel vertebral body endplate spurring without disc height loss.     The pedicles appear intact.     Soft tissues are unremarkable.     IMPRESSION:        No acute osseous abnormality.       Degenerative changes as described.    Radiology Results Review: I have reviewed radiology reports from 6/27/25 including: MRI spine.             [1]   Past Medical History:  Diagnosis Date    Anxiety     Cancer (HCC)     Diverticulitis of colon 1/19/2024    GERD (gastroesophageal reflux disease)     Lactose intolerance 1/1/1995    Oral cancer (HCC)    [2]   Past Surgical History:  Procedure Laterality Date    COLONOSCOPY  2013    MOUTH SURGERY      oral cancer     WRIST SURGERY Left    [3]   Family History  Problem Relation Name Age of Onset    Heart disease Father      Heart disease Brother      Depression Brother Lambert     Heart disease Brother Louis     Colon cancer Neg Hx      Colon polyps Neg Hx     [4]   Allergies  Allergen Reactions    Ciprofloxacin Hives    Levaquin [Levofloxacin] Anxiety    Sulfa Antibiotics Anxiety

## 2025-07-15 ENCOUNTER — HOSPITAL ENCOUNTER (OUTPATIENT)
Dept: RADIOLOGY | Facility: MEDICAL CENTER | Age: 50
Discharge: HOME | End: 2025-07-15
Attending: NURSE PRACTITIONER
Payer: COMMERCIAL

## 2025-07-15 VITALS
RESPIRATION RATE: 20 BRPM | OXYGEN SATURATION: 97 % | TEMPERATURE: 98.4 F | HEART RATE: 110 BPM | DIASTOLIC BLOOD PRESSURE: 89 MMHG | SYSTOLIC BLOOD PRESSURE: 139 MMHG

## 2025-07-15 DIAGNOSIS — M79.18 MYOFASCIAL PAIN SYNDROME: ICD-10-CM

## 2025-07-15 PROCEDURE — 77002 NEEDLE LOCALIZATION BY XRAY: CPT | Performed by: PHYSICAL MEDICINE & REHABILITATION

## 2025-07-15 PROCEDURE — 20552 NJX 1/MLT TRIGGER POINT 1/2: CPT | Performed by: PHYSICAL MEDICINE & REHABILITATION

## 2025-07-15 RX ORDER — METHYLPREDNISOLONE ACETATE 40 MG/ML
40 INJECTION, SUSPENSION INTRA-ARTICULAR; INTRALESIONAL; INTRAMUSCULAR; PARENTERAL; SOFT TISSUE ONCE
Status: COMPLETED | OUTPATIENT
Start: 2025-07-15 | End: 2025-07-15

## 2025-07-15 RX ORDER — BUPIVACAINE HCL/PF 2.5 MG/ML
2 VIAL (ML) INJECTION ONCE
Status: COMPLETED | OUTPATIENT
Start: 2025-07-15 | End: 2025-07-15

## 2025-07-15 RX ADMIN — METHYLPREDNISOLONE ACETATE 40 MG: 40 INJECTION, SUSPENSION INTRA-ARTICULAR; INTRALESIONAL; INTRAMUSCULAR; INTRASYNOVIAL; SOFT TISSUE at 15:01

## 2025-07-15 RX ADMIN — IOHEXOL 2 ML: 300 INJECTION, SOLUTION INTRAVENOUS at 15:01

## 2025-07-15 RX ADMIN — BUPIVACAINE HYDROCHLORIDE 2 ML: 2.5 INJECTION, SOLUTION EPIDURAL; INFILTRATION; INTRACAUDAL at 15:01

## 2025-07-18 ENCOUNTER — APPOINTMENT (OUTPATIENT)
Dept: PHYSICAL THERAPY | Facility: CLINIC | Age: 50
End: 2025-07-18
Attending: PHYSICAL THERAPIST
Payer: COMMERCIAL

## 2025-07-21 ENCOUNTER — HOSPITAL ENCOUNTER (OUTPATIENT)
Dept: RADIOLOGY | Facility: HOSPITAL | Age: 50
Discharge: HOME | End: 2025-07-21
Payer: COMMERCIAL

## 2025-07-21 ENCOUNTER — RESULTS FOLLOW-UP (OUTPATIENT)
Dept: PAIN MEDICINE | Facility: CLINIC | Age: 50
End: 2025-07-21

## 2025-07-21 DIAGNOSIS — M53.3 SACRAL PAIN: ICD-10-CM

## 2025-07-21 DIAGNOSIS — M53.3 SACRAL PAIN: Primary | ICD-10-CM

## 2025-07-21 PROCEDURE — 72220 X-RAY EXAM SACRUM TAILBONE: CPT

## 2025-07-23 ENCOUNTER — TELEPHONE (OUTPATIENT)
Age: 50
End: 2025-07-23

## 2025-07-23 NOTE — TELEPHONE ENCOUNTER
Patient called and reported that his daughter sees Emmanuel Bird for med management. He reported that he asked if Emmanuel could also see him too and he reported that he could. Writer informed patient that message would be sent to Emmanuel in addition to support services to see if this was approved or if he needs to be waitlisted.

## 2025-07-25 ENCOUNTER — EVALUATION (OUTPATIENT)
Dept: PHYSICAL THERAPY | Facility: CLINIC | Age: 50
End: 2025-07-25
Attending: PHYSICAL THERAPIST
Payer: COMMERCIAL

## 2025-07-25 VITALS
HEART RATE: 78 BPM | OXYGEN SATURATION: 95 % | SYSTOLIC BLOOD PRESSURE: 120 MMHG | TEMPERATURE: 97.1 F | DIASTOLIC BLOOD PRESSURE: 82 MMHG

## 2025-07-25 DIAGNOSIS — M54.17 LUMBOSACRAL RADICULOPATHY: Primary | ICD-10-CM

## 2025-07-25 PROCEDURE — 97163 PT EVAL HIGH COMPLEX 45 MIN: CPT | Performed by: PHYSICAL THERAPIST

## 2025-07-25 PROCEDURE — 97110 THERAPEUTIC EXERCISES: CPT | Performed by: PHYSICAL THERAPIST

## 2025-07-25 NOTE — PROGRESS NOTES
PT Evaluation     Today's date: 2025  Patient name: Idris Reyes  : 1975  MRN: [id number]  Referring provider: Phoenix Patel PT  Dx:   Encounter Diagnosis     ICD-10-CM    1. Lumbosacral radiculopathy  M54.17                      Assessment  Impairments: abnormal coordination, abnormal muscle firing, abnormal muscle tone, abnormal or restricted ROM, abnormal movement, activity intolerance, impaired balance, impaired physical strength, pain with function and poor posture   Symptom irritability: moderate    Assessment details: Problem List:  1) + SIJ cluster  2)+ slump neurogenic signs    Idris Reyes is a pleasant 49 y.o. male who presents with increased R sided lumbar radicular signs and symptoms consistent with L5-S1 referral secondary to neurogenic irritation with noted slump signs and SLR signs as well as + SIJ referral patterns with + cluster findings 4/5 special testing.  Clinically demonstrates a + extension bias with centralization with REIL and BRANDO.  This suggests the need for skilled manual intervention and PNE to address anxiety around pain in order to maximize function and pain self efficacy.    No further referral appears necessary at this time based upon examination results.  I expect he will benefit from 8 weeks of PT to address mobility and neural deficits.        Comparable signs:  1) Slump  2) ASLR  Understanding of Dx/Px/POC: good     Prognosis: good    Goals  Short Term Goals (4 weeks)  1.) Establish independence with HEP  2.) Decrease subjective pain levels from NPRS at least to 2-5/10 at rest and with activity  3.) Improve L/S ROM at least 5-10 degrees into all planes to allow for improved ease of movement with less guarding    Long Term Goals (8 weeks)  1.) Improve L/S ROM to WNL in all planes to restore normal movement with ADLs and function  2.) Improve hip strength to 5/5 in all planes in order to return to pain-free ADLs and function  3.) Improve FOTO score at  least to 75 points showing improved self reported disability        Plan  Patient would benefit from: PT eval and skilled physical therapy  Planned modality interventions: biofeedback, cryotherapy, electrical stimulation/Russian stimulation and TENS    Planned therapy interventions: abdominal trunk stabilization, activity modification, ADL retraining, ADL training, balance, balance/weight bearing training, behavior modification, body mechanics training, coordination, compression, flexibility, functional ROM exercises, gait training, graded activity, graded exercise, graded motor, home exercise program, IADL retraining, transfer training, therapeutic training, therapeutic exercise, therapeutic activities, stretching, strengthening, sensory integrative techniques, self care, postural training, patient/caregiver education, neuromuscular re-education, nerve gliding, muscle pump exercises, motor coordination training, massage, manual therapy, IASTM and joint mobilization  Speech planned therapy intervention: NMES    Frequency: 2x week  Duration in weeks: 8  Plan of Care beginning date: 7/25/2025  Plan of Care expiration date: 9/26/2025  Treatment plan discussed with: patient  Plan details: Initiate POC for stability; monitor sxs and progress as able         Subjective Evaluation    History of Present Illness  Date of onset: 5/15/2025  Mechanism of injury: Idris Reyes is a 49 y.o. male who presents with increased R LE pain and NT sxs down R LE starting 2 months ago while sitting to drive to play golf. Notes he decided to seek out MD consult where X-rays were (-) for fx and MRI provided (-) for major nerve pathology and provided an injection to piriformis on 7/15/25.  Had a consult with MD post and was provided a script for OPPT provided.  Reports he is looking for clarity to improve his symptoms.  Notes that lack of clarity leads to his depression. Reports increased night pain at R hip with laying on it leading to  disrupted sleep.  Denies any changes in bowel or bladder function. Reports increased R gastroc tightness and spasm and increased tingling sensation at B/l gluteal mms.  No F/U with MD.  Wishes to return to PLOF pain-free and be able to sit and stand longer and return to fishing and golf.              Not a recurrent problem   Quality of life: good    Patient Goals  Patient goals for therapy: decreased edema, decreased pain, improved balance, increased motion and increased strength    Pain  Current pain ratin  At best pain rating: 3  At worst pain ratin  Location: L/S  Quality: sharp, radiating and dull ache  Relieving factors: rest, support, change in position, ice and medications  Aggravating factors: sitting and lifting  Progression: improved    Social Support  Lives in: multiple-level home  Lives with: spouse, young children and adult children    Employment status: working  Exercise history: 2 days a week; walk his dog daily weight lifting      Diagnostic Tests  MRI studies: abnormal  Treatments  Current treatment: physical therapy        Objective     Neurological Testing     Sensation     Lumbar   Left   Intact: light touch    Right   Intact: light touch    Reflexes   Left   Patellar (L4): brisk (3+)  Achilles (S1): brisk (3+)  Clonus sign: negative    Right   Patellar (L4): brisk (3+)  Achilles (S1): brisk (3+)  Clonus sign: negative    Active Range of Motion     Lumbar   Flexion:  WFL and with pain  Extension:  with pain Restriction level: moderate  Left lateral flexion:  WFL and with pain  Right lateral flexion:  WFL and with pain  Left rotation:  Restriction level: minimal  Right rotation:  WFL    Strength/Myotome Testing     Lumbar   Left   Heel walk: normal  Toe walk: normal    Right   Heel walk: normal  Toe walk: normal    Left Hip   Planes of Motion   Flexion: 5  Adduction: 5  External rotation: 5  Internal rotation: 5    Right Hip   Planes of Motion   Flexion: 5  Adduction: 5  External  rotation: 4  Internal rotation: 4+    Left Knee   Flexion: 5  Extension: 5    Right Knee   Flexion: 5  Extension: 5    Left Ankle/Foot   Dorsiflexion: 5  Plantar flexion: 5  Great toe extension: 5    Right Ankle/Foot   Dorsiflexion: 5  Plantar flexion: 5  Great toe extension: 5    Tests     Lumbar   Positive SIJ compression, sacroiliac distraction , sacral thrust and sacral spring .     Left   Negative crossed SLR, passive SLR and slump test.     Right   Positive passive SLR and slump test.   Negative crossed SLR.     Left Pelvic Girdle/Sacrum   Negative: active SLR test.     Right Pelvic Girdle/Sacrum   Positive: active SLR test.     Left Hip   Negative long sit.     Right Hip   Positive long sit.     Functional Assessment      Squat    Left within functional limits and right within functional limits.     Single Leg Stance   Left: 19 seconds  Right: 12 seconds            Daily Treatment Diary     Precautions: Cancer history, smoker, anxiety, depression  CO-MORBIDITIES:Cancer history of mouth; IBS  TO MD On: NA  FOTO Completed On: 7/25/25    POC Expires Reeval for Medicare to be completed  Unit Limit Auth Expiration Date PT/OT/STVisit Limit   9/26/25 By visit JOSEPHINE BURTON 12/31/25 BOMN    Completed on visit                    Auth Status DATE 7/25        Approved Visit # 1         Remaining 98        MANUAL THERAPY         CPA to L/S         HVLAT to SIJ R post                                             THERAPEUTIC EXERCISE HEP         REIL 10x2         BRANDO 10x2                                                                                                                                           NEUROMUSCULAR REEDUCATION                                                                                                                                                       THERAPEUTIC ACTIVITY                                                  GAIT TRAINING                                                  MODALITIES

## 2025-07-25 NOTE — LETTER
2025    Nilam Joseph MD  [address]  [facility] [address]    Patient: Idris Reyes  YOB: 1975   Date of Visit: 2025      Dear Dr. Nilam Joseph MD:    One of your patients, Idris Reyes, was referred to me by the [facility].  Please see the evaluation summary attached below.  If care is required beyond 30 days to help your patient reach their goals, I will send you a request for signature on the plan of care.    If you have any questions or concerns, please don't hesitate to call.      Sincerely,    Aki Don, PT      Primary Care Provider:      I certify that I have read the below Plan of Care and certify the need for these services furnished under this plan of treatment while under my care.                    Nilam Joseph MD  [address]  [facility] [address]  Via In Basket           PT Evaluation     Today's date: 2025  Patient name: Idris Reyes  : 1975  MRN: [id number]  Referring provider: Phoenix Patel, ELISABETH  Dx:   Encounter Diagnosis     ICD-10-CM    1. Lumbosacral radiculopathy  M54.17                      Assessment  Impairments: abnormal coordination, abnormal muscle firing, abnormal muscle tone, abnormal or restricted ROM, abnormal movement, activity intolerance, impaired balance, impaired physical strength, pain with function and poor posture   Symptom irritability: moderate    Assessment details: Problem List:  1) + SIJ cluster  2)+ slump neurogenic signs    Idris Reyes is a pleasant 49 y.o. male who presents with increased R sided lumbar radicular signs and symptoms consistent with L5-S1 referral secondary to neurogenic irritation with noted slump signs and SLR signs as well as + SIJ referral patterns with + cluster findings 4/5 special testing.  Clinically demonstrates a + extension bias with centralization with REIL and BRANDO.  This suggests the need for skilled manual intervention and PNE  to address anxiety around pain in order to maximize function and pain self efficacy.    No further referral appears necessary at this time based upon examination results.  I expect he will benefit from 8 weeks of PT to address mobility and neural deficits.        Comparable signs:  1) Slump  2) ASLR  Understanding of Dx/Px/POC: good     Prognosis: good    Goals  Short Term Goals (4 weeks)  1.) Establish independence with HEP  2.) Decrease subjective pain levels from NPRS at least to 2-5/10 at rest and with activity  3.) Improve L/S ROM at least 5-10 degrees into all planes to allow for improved ease of movement with less guarding    Long Term Goals (8 weeks)  1.) Improve L/S ROM to WNL in all planes to restore normal movement with ADLs and function  2.) Improve hip strength to 5/5 in all planes in order to return to pain-free ADLs and function  3.) Improve FOTO score at least to 75 points showing improved self reported disability        Plan  Patient would benefit from: PT eval and skilled physical therapy  Planned modality interventions: biofeedback, cryotherapy, electrical stimulation/Russian stimulation and TENS    Planned therapy interventions: abdominal trunk stabilization, activity modification, ADL retraining, ADL training, balance, balance/weight bearing training, behavior modification, body mechanics training, coordination, compression, flexibility, functional ROM exercises, gait training, graded activity, graded exercise, graded motor, home exercise program, IADL retraining, transfer training, therapeutic training, therapeutic exercise, therapeutic activities, stretching, strengthening, sensory integrative techniques, self care, postural training, patient/caregiver education, neuromuscular re-education, nerve gliding, muscle pump exercises, motor coordination training, massage, manual therapy, IASTM and joint mobilization  Speech planned therapy intervention: NMES    Frequency: 2x week  Duration in weeks:  8  Plan of Care beginning date: 2025  Plan of Care expiration date: 2025  Treatment plan discussed with: patient  Plan details: Initiate POC for stability; monitor sxs and progress as able         Subjective Evaluation    History of Present Illness  Date of onset: 5/15/2025  Mechanism of injury: Idris Reyes is a 49 y.o. male who presents with increased R LE pain and NT sxs down R LE starting 2 months ago while sitting to drive to play golf. Notes he decided to seek out MD consult where X-rays were (-) for fx and MRI provided (-) for major nerve pathology and provided an injection to piriformis on 7/15/25.  Had a consult with MD post and was provided a script for OPPT provided.  Reports he is looking for clarity to improve his symptoms.  Notes that lack of clarity leads to his depression. Reports increased night pain at R hip with laying on it leading to disrupted sleep.  Denies any changes in bowel or bladder function. Reports increased R gastroc tightness and spasm and increased tingling sensation at B/l gluteal mms.  No F/U with MD.  Wishes to return to PLOF pain-free and be able to sit and stand longer and return to fishing and golf.              Not a recurrent problem   Quality of life: good    Patient Goals  Patient goals for therapy: decreased edema, decreased pain, improved balance, increased motion and increased strength    Pain  Current pain ratin  At best pain rating: 3  At worst pain ratin  Location: L/S  Quality: sharp, radiating and dull ache  Relieving factors: rest, support, change in position, ice and medications  Aggravating factors: sitting and lifting  Progression: improved    Social Support  Lives in: multiple-level home  Lives with: spouse, young children and adult children    Employment status: working  Exercise history: 2 days a week; walk his dog daily weight lifting      Diagnostic Tests  MRI studies: abnormal  Treatments  Current treatment: physical  therapy        Objective     Neurological Testing     Sensation     Lumbar   Left   Intact: light touch    Right   Intact: light touch    Reflexes   Left   Patellar (L4): brisk (3+)  Achilles (S1): brisk (3+)  Clonus sign: negative    Right   Patellar (L4): brisk (3+)  Achilles (S1): brisk (3+)  Clonus sign: negative    Active Range of Motion     Lumbar   Flexion:  WFL and with pain  Extension:  with pain Restriction level: moderate  Left lateral flexion:  WFL and with pain  Right lateral flexion:  WFL and with pain  Left rotation:  Restriction level: minimal  Right rotation:  WFL    Strength/Myotome Testing     Lumbar   Left   Heel walk: normal  Toe walk: normal    Right   Heel walk: normal  Toe walk: normal    Left Hip   Planes of Motion   Flexion: 5  Adduction: 5  External rotation: 5  Internal rotation: 5    Right Hip   Planes of Motion   Flexion: 5  Adduction: 5  External rotation: 4  Internal rotation: 4+    Left Knee   Flexion: 5  Extension: 5    Right Knee   Flexion: 5  Extension: 5    Left Ankle/Foot   Dorsiflexion: 5  Plantar flexion: 5  Great toe extension: 5    Right Ankle/Foot   Dorsiflexion: 5  Plantar flexion: 5  Great toe extension: 5    Tests     Lumbar   Positive SIJ compression, sacroiliac distraction , sacral thrust and sacral spring .     Left   Negative crossed SLR, passive SLR and slump test.     Right   Positive passive SLR and slump test.   Negative crossed SLR.     Left Pelvic Girdle/Sacrum   Negative: active SLR test.     Right Pelvic Girdle/Sacrum   Positive: active SLR test.     Left Hip   Negative long sit.     Right Hip   Positive long sit.     Functional Assessment      Squat    Left within functional limits and right within functional limits.     Single Leg Stance   Left: 19 seconds  Right: 12 seconds            Daily Treatment Diary     Precautions: Cancer history, smoker, anxiety, depression  CO-MORBIDITIES:Cancer history of mouth; IBS  TO MD On: NA  FOTO Completed On:  7/25/25    POC Expires Reeval for Medicare to be completed  Unit Limit Auth Expiration Date PT/OT/STVisit Limit   9/26/25 By visit NA NA 12/31/25 BOMN    Completed on visit                    Auth Status DATE 7/25        Approved Visit # 1         Remaining 98        MANUAL THERAPY         CPA to L/S         HVLAT to SIJ R post                                             THERAPEUTIC EXERCISE HEP         REIL 10x2         BRANDO 10x2                                                                                                                                           NEUROMUSCULAR REEDUCATION                                                                                                                                                       THERAPEUTIC ACTIVITY                                                  GAIT TRAINING                                                  MODALITIES

## 2025-07-29 ENCOUNTER — TELEPHONE (OUTPATIENT)
Dept: PAIN MEDICINE | Facility: CLINIC | Age: 50
End: 2025-07-29

## 2025-07-30 ENCOUNTER — OFFICE VISIT (OUTPATIENT)
Dept: PHYSICAL THERAPY | Facility: CLINIC | Age: 50
End: 2025-07-30
Attending: PHYSICAL THERAPIST
Payer: COMMERCIAL

## 2025-07-30 DIAGNOSIS — M54.17 LUMBOSACRAL RADICULOPATHY: Primary | ICD-10-CM

## 2025-07-30 PROCEDURE — 97140 MANUAL THERAPY 1/> REGIONS: CPT | Performed by: PHYSICAL THERAPIST

## 2025-07-30 PROCEDURE — 97110 THERAPEUTIC EXERCISES: CPT | Performed by: PHYSICAL THERAPIST

## 2025-07-30 PROCEDURE — 97112 NEUROMUSCULAR REEDUCATION: CPT | Performed by: PHYSICAL THERAPIST

## 2025-08-04 ENCOUNTER — TELEPHONE (OUTPATIENT)
Dept: PSYCHIATRY | Facility: CLINIC | Age: 50
End: 2025-08-04

## 2025-08-04 DIAGNOSIS — M79.18 MYOFASCIAL PAIN SYNDROME: Primary | ICD-10-CM

## 2025-08-04 RX ORDER — PREGABALIN 25 MG/1
25 CAPSULE ORAL 2 TIMES DAILY
Qty: 60 CAPSULE | Refills: 0 | Status: SHIPPED | OUTPATIENT
Start: 2025-08-04

## 2025-08-05 ENCOUNTER — OFFICE VISIT (OUTPATIENT)
Dept: PHYSICAL THERAPY | Facility: CLINIC | Age: 50
End: 2025-08-05
Attending: PHYSICAL THERAPIST
Payer: COMMERCIAL

## 2025-08-05 DIAGNOSIS — M54.17 LUMBOSACRAL RADICULOPATHY: Primary | ICD-10-CM

## 2025-08-05 PROCEDURE — 97110 THERAPEUTIC EXERCISES: CPT | Performed by: PHYSICAL THERAPIST

## 2025-08-05 PROCEDURE — 97140 MANUAL THERAPY 1/> REGIONS: CPT | Performed by: PHYSICAL THERAPIST

## 2025-08-05 PROCEDURE — 97112 NEUROMUSCULAR REEDUCATION: CPT | Performed by: PHYSICAL THERAPIST

## 2025-08-19 ENCOUNTER — OFFICE VISIT (OUTPATIENT)
Dept: PHYSICAL THERAPY | Facility: CLINIC | Age: 50
End: 2025-08-19
Attending: PHYSICAL THERAPIST
Payer: COMMERCIAL

## 2025-08-19 DIAGNOSIS — F41.1 GENERALIZED ANXIETY DISORDER WITH PANIC ATTACKS: Primary | ICD-10-CM

## 2025-08-19 DIAGNOSIS — F41.0 GENERALIZED ANXIETY DISORDER WITH PANIC ATTACKS: Primary | ICD-10-CM

## 2025-08-19 DIAGNOSIS — M54.17 LUMBOSACRAL RADICULOPATHY: Primary | ICD-10-CM

## 2025-08-19 PROCEDURE — 97110 THERAPEUTIC EXERCISES: CPT | Performed by: PHYSICAL THERAPIST

## 2025-08-19 PROCEDURE — 97140 MANUAL THERAPY 1/> REGIONS: CPT | Performed by: PHYSICAL THERAPIST

## 2025-08-19 RX ORDER — CLONAZEPAM 1 MG/1
1 TABLET ORAL 4 TIMES DAILY PRN
Qty: 120 TABLET | Refills: 1 | Status: SHIPPED | OUTPATIENT
Start: 2025-08-19

## 2025-08-21 ENCOUNTER — OFFICE VISIT (OUTPATIENT)
Dept: PAIN MEDICINE | Facility: CLINIC | Age: 50
End: 2025-08-21
Payer: COMMERCIAL

## 2025-08-21 VITALS — WEIGHT: 180 LBS | BODY MASS INDEX: 24.38 KG/M2 | TEMPERATURE: 98.7 F | HEIGHT: 72 IN

## 2025-08-21 DIAGNOSIS — M46.1 SACROILIITIS (HCC): Primary | ICD-10-CM

## 2025-08-21 PROCEDURE — 99214 OFFICE O/P EST MOD 30 MIN: CPT | Performed by: NURSE PRACTITIONER

## 2025-08-22 ENCOUNTER — TELEPHONE (OUTPATIENT)
Dept: PSYCHIATRY | Facility: CLINIC | Age: 50
End: 2025-08-22